# Patient Record
Sex: FEMALE | Race: WHITE | NOT HISPANIC OR LATINO | Employment: UNEMPLOYED | URBAN - METROPOLITAN AREA
[De-identification: names, ages, dates, MRNs, and addresses within clinical notes are randomized per-mention and may not be internally consistent; named-entity substitution may affect disease eponyms.]

---

## 2018-05-11 ENCOUNTER — HOSPITAL ENCOUNTER (EMERGENCY)
Facility: HOSPITAL | Age: 58
Discharge: HOME | End: 2018-05-11
Attending: EMERGENCY MEDICINE | Admitting: EMERGENCY MEDICINE
Payer: COMMERCIAL

## 2018-05-11 VITALS
OXYGEN SATURATION: 97 % | TEMPERATURE: 98 F | HEIGHT: 61 IN | WEIGHT: 236 LBS | DIASTOLIC BLOOD PRESSURE: 75 MMHG | BODY MASS INDEX: 44.56 KG/M2 | RESPIRATION RATE: 17 BRPM | HEART RATE: 92 BPM | SYSTOLIC BLOOD PRESSURE: 125 MMHG

## 2018-05-11 DIAGNOSIS — S80.01XA CONTUSION OF RIGHT KNEE: Primary | ICD-10-CM

## 2018-05-11 PROCEDURE — 99283 EMERGENCY DEPT VISIT LOW MDM: CPT

## 2018-05-11 NOTE — ED PROVIDER NOTES
History  Chief Complaint   Patient presents with    Knee Pain     Pt presents to ED with R knee pain x 3 weeks  Pt states she fell on knee 3 weeks ago  Had xray which showed fluid in the knee  Pt would like fluid removed from knee      77-year-old female here for right knee pain  She states she fell on it 3 weeks ago and had an x-ray afterwards which showed a large effusion  Today she continues to have pain  Swelling has improved  She states if still fluid on her knee and wants it removed today  She was told to follow up with Orthopedics and has not  She denies any redness or warmth to the joint  No fevers or chills  No difficulty ranging the joint however does have some pain when ranging  No other injuries reported  Does not take anticoagulants or antiplatelets  No prior injuries or traumas aside from the previously mentioned injury  No difficulty walking on the joint  She takes Tylenol and her pain improves          History provided by:  Patient   used: No    Knee Pain   Location:  Knee  Time since incident:  3 weeks  Injury: yes    Mechanism of injury: fall    Fall:     Fall occurred:  Tripped    Height of fall:  Standing    Impact surface:  Hard floor    Point of impact:  Knees    Entrapped after fall: no    Knee location:  R knee  Pain details:     Quality:  Aching    Radiates to:  Does not radiate    Severity:  Moderate    Onset quality:  Gradual    Timing:  Constant    Progression:  Unchanged  Chronicity:  New  Dislocation: no    Foreign body present:  No foreign bodies  Tetanus status:  Unknown  Prior injury to area:  No  Relieved by:  Acetaminophen and rest  Worsened by:  Extension, flexion and bearing weight  Ineffective treatments:  None tried  Associated symptoms: swelling (Trace, medial aspect)    Associated symptoms: no back pain, no decreased ROM, no fatigue, no fever, no itching, no muscle weakness, no neck pain, no numbness, no stiffness and no tingling        None History reviewed  No pertinent past medical history  History reviewed  No pertinent surgical history  History reviewed  No pertinent family history  I have reviewed and agree with the history as documented  Social History   Substance Use Topics    Smoking status: Current Every Day Smoker     Packs/day: 0 50    Smokeless tobacco: Never Used    Alcohol use No        Review of Systems   Constitutional: Negative  Negative for fatigue and fever  HENT: Negative for dental problem, ear pain, hearing loss, nosebleeds, tinnitus and trouble swallowing  Eyes: Negative for photophobia, pain and visual disturbance  Respiratory: Negative for apnea, cough, choking, chest tightness, shortness of breath, wheezing and stridor  Gastrointestinal: Negative for abdominal pain, nausea and vomiting  Genitourinary: Negative for decreased urine volume and enuresis  Musculoskeletal: Negative for back pain, myalgias, neck pain, neck stiffness and stiffness  Right knee pain and swelling     Skin: Negative for itching, pallor, rash and wound  Allergic/Immunologic: Negative  Neurological: Negative for dizziness, syncope, speech difficulty, weakness, light-headedness, numbness and headaches  Physical Exam  ED Triage Vitals [05/11/18 1851]   Temperature Pulse Respirations Blood Pressure SpO2   98 °F (36 7 °C) 92 17 125/75 97 %      Temp Source Heart Rate Source Patient Position - Orthostatic VS BP Location FiO2 (%)   Oral Monitor Lying Right arm --      Pain Score       Worst Possible Pain           Orthostatic Vital Signs  Vitals:    05/11/18 1851   BP: 125/75   Pulse: 92   Patient Position - Orthostatic VS: Lying       Physical Exam   Constitutional: She is oriented to person, place, and time  She appears well-developed and well-nourished  Non-toxic appearance  She does not have a sickly appearance  She does not appear ill  No distress  HENT:   Head: Normocephalic and atraumatic     Eyes: EOM and lids are normal  Pupils are equal, round, and reactive to light  Neck: Normal range of motion  Neck supple  Cardiovascular: Normal rate, regular rhythm, S1 normal, S2 normal, normal heart sounds, intact distal pulses and normal pulses  Exam reveals no gallop, no distant heart sounds, no friction rub and no decreased pulses  No murmur heard  Pulses:       Radial pulses are 2+ on the right side, and 2+ on the left side  Pulmonary/Chest: Effort normal and breath sounds normal  No accessory muscle usage  No apnea, no tachypnea and no bradypnea  No respiratory distress  She has no decreased breath sounds  She has no wheezes  She has no rhonchi  She has no rales  Abdominal: Soft  Normal appearance and bowel sounds are normal  She exhibits no distension and no mass  There is no tenderness  There is no rigidity, no rebound and no guarding  No hernia  Musculoskeletal: She exhibits no edema or deformity  Right knee: She exhibits effusion  She exhibits normal range of motion, no swelling, no ecchymosis and no deformity  Tenderness found  Medial joint line tenderness noted  No lateral joint line, no MCL, no LCL and no patellar tendon tenderness noted  Left knee: She exhibits normal range of motion, no swelling and no effusion  No tenderness found  No medial joint line, no lateral joint line, no MCL, no LCL and no patellar tendon tenderness noted  There is no erythema or warmth to the right knee joint  No signs of infection  She has full range of motion, mild pain with ranging  There is small/trace effusion along the medial aspect of the right knee   Neurological: She is alert and oriented to person, place, and time  No cranial nerve deficit  GCS eye subscore is 4  GCS verbal subscore is 5  GCS motor subscore is 6  Skin: Skin is warm, dry and intact  No rash noted  She is not diaphoretic  No erythema  No pallor     Psychiatric: Her speech is normal    Nursing note and vitals reviewed  ED Medications  Medications - No data to display    Diagnostic Studies  Results Reviewed     None                 No orders to display              Procedures  Procedures       Phone Contacts  ED Phone Contact    ED Course                               MDM  Number of Diagnoses or Management Options  Contusion of right knee: new and requires workup  Diagnosis management comments: Differential diagnosis includes but is not limited to sprain, strain, fracture, contusion, traumatic hematoma, doubt septic arthritis, doubt gout  Risk of Complications, Morbidity, and/or Mortality  Presenting problems: low  Management options: low  General comments: Plan/MDM:  66-year-old female here for rightKnee pain and swelling  This is been ongoing for 3 weeks  At this point time the swelling has improved  There is only trace effusion at this time  No signs of infection  Explained we do not routinely performed joint aspiration for small effusions  Explained that I have no suspicion of infected/septic arthritis at this time  I recommended she follow up with Orthopedics  Will provide her with an Ace wrap  She has already had imaging and no injury since her imaging to warrant a new x-ray  She agrees with this  She will follow up as instructed  We discussed return parameters  Patient understands agrees the plan  Her pain is well controlled  Patient Progress  Patient progress: stable    CritCare Time    Disposition  Final diagnoses:   Contusion of right knee     Time reflects when diagnosis was documented in both MDM as applicable and the Disposition within this note     Time User Action Codes Description Comment    5/11/2018  7:17 PM Pearl SEE Add [[id number] [id number]] Contusion of right knee       ED Disposition     ED Disposition Condition Comment    Discharge  Niurkaer Jossie discharge to home/self care      Condition at discharge: Good        Follow-up Information     Follow up With Specialties Details Why [phone] [facility] Call today for follow up appointment for your knee pain [address] [facility] [phone]        There are no discharge medications for this patient  No discharge procedures on file      ED Provider  Electronically Signed by           Audrey Rios PA-C  05/11/18 4673

## 2018-05-11 NOTE — DISCHARGE INSTRUCTIONS
Return to the Emergency Department sooner if increased pain, swelling, numbness, weakness, vomiting, difficulty breathing, redness  Ice, elevate  Contusion in Adults   WHAT YOU NEED TO KNOW:   What is a contusion? A contusion is a bruise that appears on your skin after an injury  A bruise happens when small blood vessels tear but skin does not  When blood vessels tear, blood leaks into nearby tissue, such as soft tissue or muscle  What causes a contusion? A hard object or a strained muscle can leave a bruise on your skin  A twisted knee or ankle can cause a bone bruise  You may get a bruise near an area where you have had blood taken for medical tests  What increases my risk for a contusion? · A bleeding disorder that makes you bleed more easily    · Kidney or liver disease, or an infection    · A family history of bleeding problems    · Medicines such as blood thinners or certain over-the-counter medicines and herbal medicines    · Weakened skin and muscles from older age or poor nutrition  What other signs and symptoms may I have with a contusion? · Pain that increases when you touch the bruise, walk, or use the area around the bruise    · Swelling or a lump at the site of the bruise or near it    · Red, blue, or black skin that may change to green or yellow after a few days           · Stiffness or problems moving the bruised area of your body  How is a contusion diagnosed? Your healthcare provider may ask about any injuries, infections, or bleeding problems you had in the past  He or she will check the skin over the injured area  He or she may touch it to see where it hurts  He or she may also check for problems you may have when you move your bruised area  You may need any of the following tests:  · Blood tests  may be used to check for blood disorders or to see how long it takes for your blood to clot       · Ultrasound  pictures may show how deep the bruise is and if any of your organs, such as your liver, are injured  · MRI  pictures may show if a hematoma (pooling of blood) has started to form  You may be given contrast liquid to help the pictures show up better  Tell the healthcare provider if you have ever had an allergic reaction to contrast liquid  Do not enter the MRI room with anything metal  Metal can cause serious injury  Tell the healthcare provider if you have any metal in or on your body  How is a contusion treated? Your bruise may heal without any treatment  Treatment depends on the part of your body that is injured, and how serious your injury is  You may need any of the following:  · NSAIDs , such as ibuprofen, help decrease swelling, pain, and fever  This medicine is available with or without a doctor's order  NSAIDs can cause stomach bleeding or kidney problems in certain people  If you take blood thinner medicine, always ask your healthcare provider if NSAIDs are safe for you  Always read the medicine label and follow directions  · Prescription pain medicine  may be given  Do not wait until the pain is severe before you take your medicine  · Aspiration  is a procedure to drain pooled blood in your muscle  This prevents increased pressure in the muscle  · Surgery  may be done to repair a tear in the muscle or relieve pressure in the muscle caused by swelling  What can I do to help my contusion heal?   · Rest the injured area  or use it less than usual  If you bruised your leg or foot, you may need crutches or a cane to help you walk  This will help you keep weight off your injured body part  · Apply ice  to decrease swelling and pain  Ice may also help prevent tissue damage  Use an ice pack, or put crushed ice in a plastic bag  Cover it with a towel and place it on your bruise for 15 to 20 minutes every hour or as directed  · Use compression  to support the area and decrease swelling  Wrap an elastic bandage around the area over the bruised muscle   Make sure the bandage is not too tight  You should be able to fit 1 finger between the bandage and your skin  · Elevate (raise) your injured body part  above the level of your heart to help decrease pain and swelling  Use pillows, blankets, or rolled towels to elevate the area as often as you can  · Do not drink alcohol  as directed  Alcohol may slow healing  · Do not stretch injured muscles  right after your injury  Ask your healthcare provider when and how you may safely stretch after your injury  Gentle stretches can help increase your flexibility  · Do not massage the area or put heating pads  on the bruise right after your injury  Heat and massage may slow healing  Your healthcare provider may tell you to apply heat after several days  At that time, heat will start to help the injury heal   How can I prevent a contusion? · Stretch and warm up before you play sports or exercise  · Wear protective gear when you play sports  Examples are shin guards and padding  · If you begin a new physical activity, start slowly to give your body a chance to adjust   When should I seek immediate care? · You have new trouble moving the injured area  · You have tingling or numbness in or near the injured area  · Your hand or foot below the bruise gets cold or turns pale  When should I contact my healthcare provider? · You find a new lump in the injured area  · Your symptoms do not improve with treatment after 4 to 5 days  · You have questions or concerns about your condition or care  CARE AGREEMENT:   You have the right to help plan your care  Learn about your health condition and how it may be treated  Discuss treatment options with your caregivers to decide what care you want to receive  You always have the right to refuse treatment  The above information is an  only  It is not intended as medical advice for individual conditions or treatments   Talk to your doctor, nurse or pharmacist before following any medical regimen to see if it is safe and effective for you  © 2017 [address] Information is for End User's use only and may not be sold, redistributed or otherwise used for commercial purposes  All illustrations and images included in CareNotes® are the copyrighted property of [employer]  or [employer]  Swollen Knee Joint   WHAT YOU NEED TO KNOW:   A swollen knee joint may be caused by arthritis or by an injury or trauma, such as a knee sprain  It may also happen if you exercise too much  It may be painful to bend or straighten your knee, or walk  DISCHARGE INSTRUCTIONS:   Return to the emergency department if:   · Your knee locks or gives way  This may cause you to fall  · Your feet or toes start to look pale or feel cold  · You cannot bear weight on your leg, or you have severe pain even after treatment  Contact your healthcare provider if:   · You have a fever  · You have redness or warmth over your knee  · The swelling does not decrease with treatment  · It gets harder or more painful to straighten your leg at the knee  · Your knee weakens, or you continue to limp  · You have questions or concerns about your condition or care  Medicines:  · NSAIDs , such as ibuprofen, help decrease swelling, pain, and fever  This medicine is available with or without a doctor's order  NSAIDs can cause stomach bleeding or kidney problems in certain people  If you take blood thinner medicine, always ask your healthcare provider if NSAIDs are safe for you  Always read the medicine label and follow directions  · Take your medicine as directed  Contact your healthcare provider if you think your medicine is not helping or if you have side effects  Tell him of her if you are allergic to any medicine  Keep a list of the medicines, vitamins, and herbs you take  Include the amounts, and when and why you take them   Bring the list or the pill bottles to follow-up visits  Carry your medicine list with you in case of an emergency  Self-care:   · Rest  your knee  Avoid activities that make the swelling or pain worse  You may need to avoid putting weight on your knee while you have pain  Crutches, a cane, or a walker can be used to avoid putting weight on your knee while it heals  · Apply ice  on your knee for 15 to 20 minutes every hour or as directed  Use an ice pack, or put crushed ice in a plastic bag  Cover it with a towel  Ice helps prevent tissue damage and decreases swelling and pain  · Compress your knee with a brace or bandage to help reduce swelling  Use a brace or bandage only as directed  · Elevate  your knee above the level of your heart as often as you can  This will help decrease swelling and pain  Prop your joint on pillows or blankets to keep it elevated comfortably  · Apply heat  on your knee for 20 to 30 minutes every 2 hours for as many days as directed  Heat helps decrease pain  Physical therapy:  A physical therapist teaches you exercises to help improve movement and strength, and to decrease pain  Follow up with your healthcare provider as directed:  Write down your questions so you remember to ask them during your visits  © 2017 [address] [employer]  Information is for End User's use only and may not be sold, redistributed or otherwise used for commercial purposes  All illustrations and images included in CareNotes® are the copyrighted property of [employer]  or [employer]  The above information is an  only  It is not intended as medical advice for individual conditions or treatments  Talk to your doctor, nurse or pharmacist before following any medical regimen to see if it is safe and effective for you

## 2022-11-21 ENCOUNTER — APPOINTMENT (OUTPATIENT)
Dept: OBGYN | Facility: CLINIC | Age: 62
End: 2022-11-21

## 2022-12-14 PROBLEM — Z00.00 ENCOUNTER FOR PREVENTIVE HEALTH EXAMINATION: Status: ACTIVE | Noted: 2022-12-14

## 2022-12-19 ENCOUNTER — APPOINTMENT (OUTPATIENT)
Dept: CARDIOTHORACIC SURGERY | Facility: CLINIC | Age: 62
End: 2022-12-19

## 2022-12-19 VITALS — HEIGHT: 61 IN | WEIGHT: 226 LBS | BODY MASS INDEX: 42.67 KG/M2

## 2022-12-19 DIAGNOSIS — F17.210 NICOTINE DEPENDENCE, CIGARETTES, UNCOMPLICATED: ICD-10-CM

## 2022-12-19 PROCEDURE — G0296 VISIT TO DETERM LDCT ELIG: CPT

## 2022-12-19 NOTE — HISTORY OF PRESENT ILLNESS
[Current] : Current [>=20 Pack-Years] : Twenty pack years or greater smoking history: Yes [TextBox_13] : Pt scheduled for telehealth and could not access Workday platform d/t technical difficulties or lack of smart phone device\par \par Referred by Dr. Krista Pérez.\par \par Ms. RACHEAL NASSAR  is a 62 year old woman with a history of Arthritis.\par \par She  was seen in the office by Dr. Krista Pérez for review of eligibility for, as well as, discussion of Low-Dose CT lung cancer screening program. Over the telephone today we reviewed and confirmed that the patient meets screening eligibility criteria:\par -Age: 62 year \par Smoking status:\par -Current smoker\par -Number of pack(s) per day:1 \par -Number of years smoked: 20\par -Number of pack years smokin\par \par Ms. NASSAR denies any signs or symptoms of lung cancer including new cough, change in cough, hemoptysis and unintentional weight loss. \par \par Ms. NASSAR denies any personal history of lung cancer. No lung cancer in a 1st degree relative. Denies any history of lung disease. Denies any history of occupational exposures. [PacksperDay] : 1 [N_Years] : 20 [PacksperYear] : 20

## 2022-12-19 NOTE — PLAN
[Lifestlye changes] : lifestyle changes [Age appropriate screenings] : Age appropriate screenings [Regular Exercise] : regular exercise [Healthful dietary options] : healthful dietary options [Medication prescribed/changed as per orders] : medication prescribed/changed as per orders [FreeTextEntry1] : Plan:\par -Low Dose CT chest for lung cancer screening\par -Follow up with patient and her referring provider after her LDCT results have been reviewed by the multi-disciplinary clinical team\par -Encouraged smoking cessation\par -Patient declined [facility] Smokers Quitline literature\par -Smoking Cessation was offered, - declined CTC prefers to cut back on her own\par \par Should I Screen? tool utilized. 6 year risk of lung cancer is 0.8%. Patient wishes to proceed with screening.\par \par Engaged in shared decision making with Ms. RACHEAL NASSAR . Answered all questions. She verbalized understanding and agreement. She knows to call back with any questions or concerns\par \par \par

## 2023-05-09 ENCOUNTER — APPOINTMENT (OUTPATIENT)
Dept: UROLOGY | Facility: CLINIC | Age: 63
End: 2023-05-09

## 2023-09-26 ENCOUNTER — APPOINTMENT (OUTPATIENT)
Dept: ORTHOPEDIC SURGERY | Facility: CLINIC | Age: 63
End: 2023-09-26

## 2025-08-27 ENCOUNTER — APPOINTMENT (OUTPATIENT)
Dept: PAIN MANAGEMENT | Facility: CLINIC | Age: 65
End: 2025-08-27

## 2025-08-29 ENCOUNTER — APPOINTMENT (OUTPATIENT)
Dept: PAIN MANAGEMENT | Facility: CLINIC | Age: 65
End: 2025-08-29